# Patient Record
Sex: MALE | Race: WHITE | NOT HISPANIC OR LATINO
[De-identification: names, ages, dates, MRNs, and addresses within clinical notes are randomized per-mention and may not be internally consistent; named-entity substitution may affect disease eponyms.]

---

## 2019-09-24 PROBLEM — Z00.00 ENCOUNTER FOR PREVENTIVE HEALTH EXAMINATION: Status: ACTIVE | Noted: 2019-09-24

## 2019-09-25 ENCOUNTER — NON-APPOINTMENT (OUTPATIENT)
Age: 79
End: 2019-09-25

## 2019-09-25 ENCOUNTER — APPOINTMENT (OUTPATIENT)
Dept: OTOLARYNGOLOGY | Facility: CLINIC | Age: 79
End: 2019-09-25
Payer: MEDICARE

## 2019-09-25 ENCOUNTER — APPOINTMENT (OUTPATIENT)
Dept: OPHTHALMOLOGY | Facility: CLINIC | Age: 79
End: 2019-09-25
Payer: MEDICARE

## 2019-09-25 VITALS
BODY MASS INDEX: 28.2 KG/M2 | HEIGHT: 70 IN | DIASTOLIC BLOOD PRESSURE: 82 MMHG | WEIGHT: 197 LBS | SYSTOLIC BLOOD PRESSURE: 155 MMHG | HEART RATE: 51 BPM

## 2019-09-25 DIAGNOSIS — Z87.11 PERSONAL HISTORY OF PEPTIC ULCER DISEASE: ICD-10-CM

## 2019-09-25 DIAGNOSIS — Z87.891 PERSONAL HISTORY OF NICOTINE DEPENDENCE: ICD-10-CM

## 2019-09-25 DIAGNOSIS — Z82.3 FAMILY HISTORY OF STROKE: ICD-10-CM

## 2019-09-25 DIAGNOSIS — Z87.09 PERSONAL HISTORY OF OTHER DISEASES OF THE RESPIRATORY SYSTEM: ICD-10-CM

## 2019-09-25 DIAGNOSIS — Z83.3 FAMILY HISTORY OF DIABETES MELLITUS: ICD-10-CM

## 2019-09-25 DIAGNOSIS — Z82.49 FAMILY HISTORY OF ISCHEMIC HEART DISEASE AND OTHER DISEASES OF THE CIRCULATORY SYSTEM: ICD-10-CM

## 2019-09-25 DIAGNOSIS — Z86.79 PERSONAL HISTORY OF OTHER DISEASES OF THE CIRCULATORY SYSTEM: ICD-10-CM

## 2019-09-25 DIAGNOSIS — Z86.39 PERSONAL HISTORY OF OTHER ENDOCRINE, NUTRITIONAL AND METABOLIC DISEASE: ICD-10-CM

## 2019-09-25 PROCEDURE — 92567 TYMPANOMETRY: CPT

## 2019-09-25 PROCEDURE — 92557 COMPREHENSIVE HEARING TEST: CPT

## 2019-09-25 PROCEDURE — 69210 REMOVE IMPACTED EAR WAX UNI: CPT

## 2019-09-25 PROCEDURE — 99204 OFFICE O/P NEW MOD 45 MIN: CPT | Mod: 25

## 2019-09-25 PROCEDURE — 99204 OFFICE O/P NEW MOD 45 MIN: CPT

## 2019-09-25 PROCEDURE — 92134 CPTRZ OPH DX IMG PST SGM RTA: CPT

## 2019-09-25 PROCEDURE — 92083 EXTENDED VISUAL FIELD XM: CPT

## 2019-09-25 NOTE — CONSULT LETTER
[FreeTextEntry2] : ENRIQUE SON\par  [FreeTextEntry1] : \par \par Dear  Dr. OG SON,\par \par I had the pleasure of seeing your patient today.  \par Please see my note below.\par \par \par Thank you very much for allowing me to participate in the care of your patient.\par \par Sincerely,\par \par \par Og Hatch MD\par [facility]\par [facility]\par  [facility]\par \par

## 2019-09-25 NOTE — HISTORY OF PRESENT ILLNESS
[de-identified] : ABDI TELLO is a 79 year old male who comes in complaining of Several issues as far as the head and neck. The first is she is concerned about his hearing. He has problems in the future and in the classroom but denies otalgia or otorrhea.\par A second is that of dizziness. He notes that when he turns his head to the right he frequently but not always will get dizzy for a while. This has not occurred with otalgia or otorrhea and he has had this in the past which resolved.\par He also notes that when he bends his neck forward he can feel faint and anxious and have diminished sensation in his feet.\par He complains of itching in his ears and has been using a cortisone cream.\par The patient had no other ear nose or throat complaints at this visit.

## 2019-09-25 NOTE — REVIEW OF SYSTEMS
[Ear Itch] : ear itch [Hearing Loss] : hearing loss [Dizziness] : dizziness [Nasal Congestion] : nasal congestion [Lightheadedness] : lightheadedness [Recent Weight Loss (___ Lbs)] : recent [unfilled] ~Ulb weight loss [Eyesight Problems] : eyesight problems [Erectile Dysfunction] : erectile dysfunction [Dry Eyes] : dry eyes [Negative] : Heme/Lymph

## 2019-09-25 NOTE — PHYSICAL EXAM
[FreeTextEntry1] : \par The patient was alert and oriented and in no distress.\par Voice was clear.\par \par Face:\par The patient had no facial asymmetry or mass.\par The skin was unremarkable.\par \par Eyes:\par The pupils were equal round and reactive to light and accommodation.\par There was no significant nystagmus or disconjugate gaze noted.\par \par Nose: \par The external nose had no significant deformity.  There was no facial tenderness.  On anterior rhinoscopy, the nasal mucosa was clear.  The anterior septum was midline.  There were no visualized polyps purulence  or masses.\par \par Oral cavity:\par The oral mucosa was normal.\par The oral and base of tongue were clear and without mass.\par The gingival and buccal mucosa were moist and without lesions.\par The palate moved well.\par There was no cleft to the palate.\par There appeared to be good salivary flow.  \par There was no pus, erythema or mass in the oral cavity.\par \par Ears:\par Procedure Note\par Removal of Cerumen- right ear   cpt 88465\par Dx:  Symptomatic cerument impaction- right ear\par Both external ears were normal.\par There was a dense cerumen impaction in the right ear.  This was cleared microscopically using suction and curettes without trauma.  Beyond that, both ear canals were clear both eardrums were intact and mobile.\par He has dry flaking skin of the ear canals\par \par Neck: \par The neck was symmetrical.\par The parotid and submandibular glands were normal without masses.\par The trachea was midline and there was no unusual crepitus.\par The thyroid was smooth and nontender and no masses were palpated.\par There was no significant cervical adenopathy.\par \par \par Neuro:\par Neurologically, the patient was awake, alert, and oriented to person, place and time. There were no obvious focal neurologic abnormalities.  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

## 2019-09-25 NOTE — ASSESSMENT
[FreeTextEntry1] : It was my impression that he had some evidence of benign positional vertigo. I suggested that at the maneuver and he says he might just try this from the Internet.\par It is my impression that the patient has an an exczematoid otitis externa.  The pathogenesis was discussed.  I suggested avoiding Q-tips.  I recommended topical moisturizing and using either Dermotic drops or other oil drops.  I suggested repeat evaluation in 6 months or earlier should the need arise.\par His symptoms when he bends his neck for did not seem vestibular nor does his night sweats. I suggested a carotid Doppler duplex to make sure there is not an obstruction but I did not hear any significant bruit in the neck.\par He has the sensory neural hearing losses. For the theater I suggested using the infra-red systems\par The hearing test was reviewed with the patient.  This showed a symmetric primarily high frequency sensory neural hearing loss affecting the speech frequencies.  I felt that the patient would benefit from hearing aids and this was  recommended

## 2019-09-30 ENCOUNTER — APPOINTMENT (OUTPATIENT)
Dept: MRI IMAGING | Facility: CLINIC | Age: 79
End: 2019-09-30

## 2019-10-01 ENCOUNTER — OUTPATIENT (OUTPATIENT)
Dept: OUTPATIENT SERVICES | Facility: HOSPITAL | Age: 79
LOS: 1 days | End: 2019-10-01

## 2019-10-01 ENCOUNTER — APPOINTMENT (OUTPATIENT)
Dept: ULTRASOUND IMAGING | Facility: CLINIC | Age: 79
End: 2019-10-01
Payer: MEDICARE

## 2019-10-01 ENCOUNTER — APPOINTMENT (OUTPATIENT)
Dept: MRI IMAGING | Facility: CLINIC | Age: 79
End: 2019-10-01
Payer: MEDICARE

## 2019-10-01 PROCEDURE — 93880 EXTRACRANIAL BILAT STUDY: CPT | Mod: 26

## 2019-10-01 PROCEDURE — 70553 MRI BRAIN STEM W/O & W/DYE: CPT | Mod: 26

## 2019-10-10 ENCOUNTER — APPOINTMENT (OUTPATIENT)
Dept: OTOLARYNGOLOGY | Facility: CLINIC | Age: 79
End: 2019-10-10
Payer: MEDICARE

## 2019-10-10 DIAGNOSIS — R93.0 ABNORMAL FINDINGS ON DIAGNOSTIC IMAGING OF SKULL AND HEAD, NOT ELSEWHERE CLASSIFIED: ICD-10-CM

## 2019-10-10 PROCEDURE — 31231 NASAL ENDOSCOPY DX: CPT

## 2019-10-10 PROCEDURE — 99213 OFFICE O/P EST LOW 20 MIN: CPT | Mod: 25

## 2019-10-21 ENCOUNTER — FORM ENCOUNTER (OUTPATIENT)
Age: 79
End: 2019-10-21

## 2019-10-22 ENCOUNTER — APPOINTMENT (OUTPATIENT)
Dept: CT IMAGING | Facility: CLINIC | Age: 79
End: 2019-10-22
Payer: MEDICARE

## 2019-10-22 ENCOUNTER — OUTPATIENT (OUTPATIENT)
Dept: OUTPATIENT SERVICES | Facility: HOSPITAL | Age: 79
LOS: 1 days | End: 2019-10-22

## 2019-10-22 PROCEDURE — 70486 CT MAXILLOFACIAL W/O DYE: CPT | Mod: 26

## 2019-10-24 NOTE — PHYSICAL EXAM
[de-identified] : Nasal endoscopy: \par \par Procedure Note:\par \par Nasal endoscopy was done with topical anesthesia and a fiberoptic endoscope.\par Indication: Nasal congestion, rule out sinusitis.\par Procedure: The nasal cavity was anesthetized with topical Afrin and Pontocaine. An  endoscope was used and inserted into the nasal cavity. \par Endocoscopy was performed to inspect the interior of the nasal cavity, the nasal septum,  the middle and superior meati, the inferior, middle and superior turbinates, and the spheno-ethmoidal  recesses, the nasopharynx and eustachian tube orifices bilaterally\par  This showed that the nasal mucosa was slightly boggy.  There was a moderate S-shaped septal deflection.  However, the middle meati were open.  There were no polyps, purulence or masses.  The eustachian tube orifices were clear.  The nasopharynx was benign and without mass.  The inferior and middle turbinates were slightly boggy, the superior meati were normal and the sphenoethmoidal recesses were without evidence of disease.\par \par  [FreeTextEntry1] : General:\par The patient was alert and oriented and in no distress.\par Voice was clear.\par \par Face:\par The patient had no facial asymmetry or mass.\par The skin was unremarkable.\par \par Ears:\par The external ears were normal without deformity.\par The ear canals were clear.\par The tympanic membranes were intact and normal.\par The canals are somewhat dry but there was no recurrence of the cerumen impaction\par \par Oral cavity:\par The oral mucosa was normal.\par The oral and base of tongue were clear and without mass.\par The gingival and buccal mucosa were moist and without lesions.\par The palate moved well.\par There was no cleft to the palate.\par There appeared to be good salivary flow.  \par There was no pus, erythema or mass in the oral cavity.\par \par Neuro:\par Neurologically, the patient was awake, alert, and oriented to person, place and time. There were no obvious focal neurologic abnormalities.  Cranial nerves II through XII were grossly intact.\par \par Neck: \par The neck was symmetrical.\par The parotid and submandibular glands were normal without masses.\par The trachea was midline and there was no unusual crepitus.\par The thyroid was smooth and nontender and no masses were palpated.\par There was no significant cervical adenopathy.

## 2019-10-24 NOTE — ADDENDUM
[FreeTextEntry1] : Discussed with radiologist.  Feels tumor rather than cyst or other etiology.  ? Inverting papilloma or possibly melanoma-  \par However- no bony osteitis -  \par although I also see no lakesha invasion, etc.\par Discussed with patient-  will likely need caldwel edgardo approach- \par \par RLP

## 2019-10-24 NOTE — HISTORY OF PRESENT ILLNESS
[de-identified] : ABDI TELLO Was seen in followup on October 10. He reports that he has a papilloma in his sinus. He does have a watery nasal discharge. His dizziness is improving. I had seen him 2 weeks ago with sensory neural hearing losses, dizziness and eczematoid otitis externa. \par He notes that he does have a watery nasal discharge on the right\par The patient had no other ear nose or throat complaints at this visit.

## 2019-10-24 NOTE — ASSESSMENT
[FreeTextEntry1] : It was my impression that his dizziness is improving and I suggested continuing with vestibular exercises.\par His Doppler duplex apparently was negative.\par However, he also had an orbital MRI and they are reading his right maxillary sinus as having an inverting papilloma. I looked at the films myself and while there is a papillomatous looking lesion of the right lateral wall, that is fairly reportable if this were the inverting papilloma based on its location. It seems much more likely to be a retention cyst or perhaps inspissated mucus.\par In any case, and this is nothing I would aggressively addressed. 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

## 2019-10-24 NOTE — PHYSICAL EXAM
[FreeTextEntry1] : General:\par The patient was alert and oriented and in no distress.\par Voice was clear.\par \par Face:\par The patient had no facial asymmetry or mass.\par The skin was unremarkable.\par \par Ears:\par The external ears were normal without deformity.\par The ear canals were clear.\par The tympanic membranes were intact and normal.\par The canals are somewhat dry but there was no recurrence of the cerumen impaction\par \par Oral cavity:\par The oral mucosa was normal.\par The oral and base of tongue were clear and without mass.\par The gingival and buccal mucosa were moist and without lesions.\par The palate moved well.\par There was no cleft to the palate.\par There appeared to be good salivary flow.  \par There was no pus, erythema or mass in the oral cavity.\par \par Neuro:\par Neurologically, the patient was awake, alert, and oriented to person, place and time. There were no obvious focal neurologic abnormalities.  Cranial nerves II through XII were grossly intact.\par \par Neck: \par The neck was symmetrical.\par The parotid and submandibular glands were normal without masses.\par The trachea was midline and there was no unusual crepitus.\par The thyroid was smooth and nontender and no masses were palpated.\par There was no significant cervical adenopathy. [de-identified] : Nasal endoscopy: \par \par Procedure Note:\par \par Nasal endoscopy was done with topical anesthesia and a fiberoptic endoscope.\par Indication: Nasal congestion, rule out sinusitis.\par Procedure: The nasal cavity was anesthetized with topical Afrin and Pontocaine. An  endoscope was used and inserted into the nasal cavity. \par Endocoscopy was performed to inspect the interior of the nasal cavity, the nasal septum,  the middle and superior meati, the inferior, middle and superior turbinates, and the spheno-ethmoidal  recesses, the nasopharynx and eustachian tube orifices bilaterally\par  This showed that the nasal mucosa was slightly boggy.  There was a moderate S-shaped septal deflection.  However, the middle meati were open.  There were no polyps, purulence or masses.  The eustachian tube orifices were clear.  The nasopharynx was benign and without mass.  The inferior and middle turbinates were slightly boggy, the superior meati were normal and the sphenoethmoidal recesses were without evidence of disease.\par \par

## 2019-10-24 NOTE — HISTORY OF PRESENT ILLNESS
[de-identified] : ABDI TELLO Was seen in followup on October 10. He reports that he has a papilloma in his sinus. He does have a watery nasal discharge. His dizziness is improving. I had seen him 2 weeks ago with sensory neural hearing losses, dizziness and eczematoid otitis externa. \par He notes that he does have a watery nasal discharge on the right\par The patient had no other ear nose or throat complaints at this visit.

## 2019-12-11 ENCOUNTER — RX CHANGE (OUTPATIENT)
Age: 79
End: 2019-12-11

## 2019-12-12 ENCOUNTER — OUTPATIENT (OUTPATIENT)
Dept: OUTPATIENT SERVICES | Facility: HOSPITAL | Age: 79
LOS: 1 days | Discharge: HOME | End: 2019-12-12
Payer: MEDICARE

## 2019-12-12 ENCOUNTER — RESULT REVIEW (OUTPATIENT)
Age: 79
End: 2019-12-12

## 2019-12-12 ENCOUNTER — APPOINTMENT (OUTPATIENT)
Dept: OTOLARYNGOLOGY | Facility: AMBULATORY SURGERY CENTER | Age: 79
End: 2019-12-12

## 2019-12-12 PROCEDURE — 61782 SCAN PROC CRANIAL EXTRA: CPT

## 2019-12-12 PROCEDURE — 31225 REMOVAL OF UPPER JAW: CPT | Mod: 52

## 2019-12-16 ENCOUNTER — APPOINTMENT (OUTPATIENT)
Dept: OTOLARYNGOLOGY | Facility: CLINIC | Age: 79
End: 2019-12-16
Payer: MEDICARE

## 2019-12-16 PROCEDURE — 99024 POSTOP FOLLOW-UP VISIT: CPT

## 2019-12-16 PROCEDURE — 31237 NSL/SINS NDSC SURG BX POLYPC: CPT | Mod: 50,58

## 2019-12-16 RX ORDER — PREDNISONE 20 MG/1
20 TABLET ORAL DAILY
Qty: 4 | Refills: 0 | Status: DISCONTINUED | COMMUNITY
Start: 2019-12-11 | End: 2019-12-16

## 2019-12-18 LAB — SURGICAL PATHOLOGY STUDY: SIGNIFICANT CHANGE UP

## 2019-12-18 NOTE — REASON FOR VISIT
[de-identified] : 4 [de-identified] : He is now 4 days status post right sided Regan-Rafael as well as right-sided balloon sinuplasty.\par He is taking antibiotics and steroids as prescribed.\par He did develop some ecchymosis in his right face.\par He has minimal pain. Next line he does not complain of tooth, malar or gingival paresthesia.\par \par PROCEDURE:  NASAL ENDOSCOPY WITH SURGICAL DEBRIDEMENT\par \par Surgeon: Dr. Quintana\par Indication: status post surgery \par Anesthetic: Topical lidocaine and Afrin\par Procedure: The patient was placed in a sitting position.  Following application of the topical anesthetic and decongestant, exam was performed with a flexible fiberoptic scope followed by a rigid endoscope.  The scope was passed along the right nasal floor to the nasopharynx.  It was then passed into the region of the middle meatus, middle turbinate, and sphenoethmoid region.  An identical procedure was performed on the left side. Debridement was performed with the 0 deg endoscope and sinus instruments. The following findings were noted:\par \par Nasal mucosa:  Mild to moderate edema\par Clots and small crusts in nasal cavities causing obstruction.\par Septum: straight. No perforation. Healing well. \par Turbinates: inferior turbinates healing well. Crusting on anterior ends. \par Right nasal cavity- suctioned and debrided\par      Right middle meatus: edema \par      Right sphenoethmoidal recess: no pus, polyps or congestion \par \par Clinically stable.\par We will await his pathology.\par I recommended he continue with oral hygiene using salt water 2-3 times a day.\par He will also begin using hypertonic saline irrigation and Flonase on the right.

## 2019-12-18 NOTE — ASSESSMENT
[FreeTextEntry1] : path-  oncocytic schneiderian papilloma- i inversion-  discussed with pathologist-  and meand discuussed with patient.  Patient doing well- dental but not V2 numbness.  \par \par RLP

## 2020-01-07 ENCOUNTER — APPOINTMENT (OUTPATIENT)
Dept: OTOLARYNGOLOGY | Facility: CLINIC | Age: 80
End: 2020-01-07
Payer: MEDICARE

## 2020-01-07 PROCEDURE — 99024 POSTOP FOLLOW-UP VISIT: CPT

## 2020-01-07 NOTE — CONSULT LETTER
[FreeTextEntry2] : ENRIQUE SON\par  [FreeTextEntry1] : \par \par Dear  Dr. OG SON,\par \par I had the pleasure of seeing your patient today.  \par Please see my note below.\par \par \par Thank you very much for allowing me to participate in the care of your patient.\par \par Sincerely,\par \par \par Quentin Hatch\par \par \par Og Hatch MD\par [facility]\par [facility]\par  [facility]\par \par

## 2020-01-07 NOTE — PHYSICAL EXAM
[FreeTextEntry1] : General:\par The patient was alert and oriented and in no distress.\par Voice was clear. The patient looked well.\par He thought there might be some swelling over the right maxillary but there was none clinical E.\par \par His oral incision was healing well.\par \par His previous lower facial ecchymosis has resolved.\par \par  [de-identified] : Nasal endoscopy: \par CPT 77636\par Procedure Note:\par \par Nasal endoscopy was done with topical anesthesia of Pontocaine and Afrin and a      nasal endoscope.\par Indication: Nasal congestion, rule out sinusitis.\par Procedure: The nasal cavity was anesthetized with topical Afrin and Pontocaine. An  endoscope was used and inserted into the nasal cavity.\par Attention was first paid to the anterior nasal cavity.\par Endocoscopy was performed to inspect the interior of the nasal cavity, the nasal septum,  the middle and superior meati, the inferior, middle and superior turbinates, and the spheno-ethmoidal  recesses, the nasopharynx and eustachian tube orifices bilaterally. \par All findings were normal except:\par \par The right middle meatus had a scant amount of colored discharge and the antrostomy was pinpoint but patent. And without purulence\par \par \par His oral incision was well healed\par \par He had no V2 hypoesthesia\par

## 2020-01-07 NOTE — REASON FOR VISIT
[Post-Operative Visit] : a post-operative visit [FreeTextEntry2] : right oncocytic inverting papilloma s/p excision

## 2020-01-07 NOTE — ASSESSMENT
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 If the antrostomy closses I would consider re opening it in the office with balloon as much for repeat evaluation as for cleaning. \par I reviewed the pathology with the patient and this is quite unlikely to undergo malignant degeneration. -

## 2020-01-07 NOTE — HISTORY OF PRESENT ILLNESS
[de-identified] : ABDI TELLO Was seen in followup on January 7. 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 His surgery was uneventful and clinically the tumor was completely removed.  He notes some dental anesthesia did not facial hypoesthesiaand the thick nasal discharge on the right side.\par The patient had no other ear nose or throat complaints at this visit.

## 2020-01-29 ENCOUNTER — APPOINTMENT (OUTPATIENT)
Dept: OTOLARYNGOLOGY | Facility: CLINIC | Age: 80
End: 2020-01-29
Payer: MEDICARE

## 2020-01-29 VITALS
WEIGHT: 197 LBS | HEART RATE: 58 BPM | DIASTOLIC BLOOD PRESSURE: 81 MMHG | BODY MASS INDEX: 28.2 KG/M2 | HEIGHT: 70 IN | SYSTOLIC BLOOD PRESSURE: 147 MMHG

## 2020-01-29 PROCEDURE — 99213 OFFICE O/P EST LOW 20 MIN: CPT | Mod: 24

## 2020-01-29 RX ORDER — ACETAMINOPHEN AND CODEINE 300; 30 MG/1; MG/1
300-30 TABLET ORAL
Qty: 12 | Refills: 0 | Status: DISCONTINUED | COMMUNITY
Start: 2019-12-11 | End: 2020-01-29

## 2020-01-29 RX ORDER — AMOXICILLIN AND CLAVULANATE POTASSIUM 875; 125 MG/1; MG/1
875-125 TABLET, COATED ORAL TWICE DAILY
Qty: 20 | Refills: 1 | Status: DISCONTINUED | COMMUNITY
Start: 2020-01-07 | End: 2020-01-29

## 2020-01-29 NOTE — CONSULT LETTER
[FreeTextEntry2] : ENRIQUE SON\par  [FreeTextEntry1] : \par \par Dear  Dr. OG SON,\par \par I had the pleasure of seeing your patient today.  \par Please see my note below.\par \par \par Thank you very much for allowing me to participate in the care of your patient.\par \par Sincerely,\par \par Quentin\par \par Og Hatch MD\par [facility]\par [facility]\par  [facility]\par \par

## 2020-01-29 NOTE — PHYSICAL EXAM
[FreeTextEntry1] : General:\par The patient was alert and oriented and in no distress.\par Voice was clear.\par The patient looked less than the stated age\par \par Face:\par The patient had no facial asymmetry or mass.\par The skin was unremarkable.\par \par Nose: \par The external nose had no significant deformity.  There was no facial tenderness.  On anterior rhinoscopy, the nasal mucosa was clear.  The anterior septum was midline.  There were no visualized polyps purulence  or masses.\par \par Ears:\par The external ears were normal without deformity.\par The ear canals were clear.\par The tympanic membranes were intact and normal.\par \par Oral cavity:\par The incision was well healed\par \par Neck: \par The neck was symmetrical.\par The parotid and submandibular glands were normal without masses.\par The trachea was midline and there was no unusual crepitus.\par The thyroid was smooth and nontender and no masses were palpated.\par There was no significant cervical adenopathy. [de-identified] : Nasal endoscopy: \par CPT 37050\par Procedure Note:\par \par Nasal endoscopy was done with topical anesthesia of Pontocaine and Afrin and a      nasal endoscope.\par Indication: Nasal congestion, rule out sinusitis.\par Procedure: The nasal cavity was anesthetized with topical Afrin and Pontocaine. An  endoscope was used and inserted into the nasal cavity.\par Attention was first paid to the anterior nasal cavity.\par Endocoscopy was performed to inspect the interior of the nasal cavity, the nasal septum,  the middle and superior meati, the inferior, middle and superior turbinates, and the spheno-ethmoidal  recesses, the nasopharynx and eustachian tube orifices bilaterally. \par All findings were normal except:\par \par There was a dry eschar on the septum and on the right and there was a small amount of crusting in the right middle meatus.\par The antrostomy, however had almost closed in a could not enter the maxillary sinus.\par

## 2020-01-29 NOTE — HISTORY OF PRESENT ILLNESS
[de-identified] : ABDI TELLO Was seen in followup on January 29. He is about 6 weeks postoperatively. He is complaining of occasional dry a bloody discharge from the right nasal cavity soreness and burning. He denies any purulence or facial pain. He still has some dizziness at times when he rolls over the right side only. His dental hypesthesia is improved but not completely resolvedThe patient had no other ear nose or throat complaints at this visit.

## 2020-01-29 NOTE — ASSESSMENT
[FreeTextEntry1] : It was my impression that he was doing well. The hypesthesia had almost completely resolved. He is having some mucosal irritation, probably from dryness and the sprays and I suggested stopping Flonase, cutting back on the nasal rinse and using topical moisturizing for the nasal cavity.\par I would like to reevaluate in 3-4 weeks and would consider imaging to see if I should dilate his antrostomy for better visualization in the future\par \par he has an apparent benign positional vertigo, unrelated to his lesion and I suggested a repositioning maneuver\par

## 2020-03-04 ENCOUNTER — APPOINTMENT (OUTPATIENT)
Dept: OTOLARYNGOLOGY | Facility: CLINIC | Age: 80
End: 2020-03-04
Payer: MEDICARE

## 2020-03-04 PROCEDURE — 31231 NASAL ENDOSCOPY DX: CPT | Mod: 58

## 2020-03-04 PROCEDURE — 99213 OFFICE O/P EST LOW 20 MIN: CPT | Mod: 25

## 2020-03-04 NOTE — CONSULT LETTER
[FreeTextEntry2] : ENRIQUE SON\par  [FreeTextEntry1] : \par \par Dear  Dr. OG SON,\par \par I had the pleasure of seeing your patient today.  \par Please see my note below.\par \par \par Thank you very much for allowing me to participate in the care of your patient.\par \par Sincerely,\par \par Quentin\par \par Og Hatch MD\par [facility]\par [facility]\par  [facility]\par \par

## 2020-03-04 NOTE — ASSESSMENT
[FreeTextEntry1] : It was my impression that he has some benign positional vertigo and I recommended the Epley maneuver\par He has done quite well from the surgery and basically is asymptomatic. He has the schneiderian papilloma with inversion.\par I can not completely visualized the antrum as the antrostomy has closed.\par I would like to see him back in followup in 3 months from now and will probably do repeat baseline imaging rather than necessarily making a wider opening for visualization.  The option would be to do a balloon Sinuplasty to afford visualization, and depending on the imaging results

## 2020-03-04 NOTE — HISTORY OF PRESENT ILLNESS
[de-identified] : ABDI TELLO was seen in followup on March 4.  He is status post excision of the right oncocytic schneiderian papilloma with inversion of the right maxilla by extended Shereen edgardo in December. At this point he is doing quite well. He also little bit of area in his dentition with diminished sensation but otherwise he is asymptomatic. His nasal airway is good and he is not having recurrent infection. He comes in for repeat evaluation. He also has some positional spanning from time to time, basically when he turns over in bed and possibly BPPV. I had recommended an Epley maneuver but she had not yet gone for this as he does not find it so bothersome.The patient had no other ear nose or throat complaints at this visit.

## 2020-03-04 NOTE — PHYSICAL EXAM
[FreeTextEntry1] : \par The patient was alert and oriented and in no distress.\par Voice was clear.\par \par Face:\par The patient had no facial asymmetry or mass.\par The skin was unremarkable.\par \par Eyes:\par The pupils were equal round and reactive to light and accommodation.\par There was no significant nystagmus or disconjugate gaze noted.\par \par Nose: \par The external nose had no significant deformity.  There was no facial tenderness.  On anterior rhinoscopy, the nasal mucosa was clear.  The anterior septum was midline.  There were no visualized polyps purulence  or masses.\par \par Oral cavity:\par The oral mucosa was normal.\par The oral and base of tongue were clear and without mass.\par The gingival and buccal mucosa were moist and without lesions.\par The palate moved well.\par There was no cleft to the palate.\par There appeared to be good salivary flow.  \par There was no pus, erythema or mass in the oral cavity.\par \par \par Ears:\par The external ears were normal without deformity.\par The ear canals were clear.\par The tympanic membranes were intact and normal.\par \par Neck: \par The neck was symmetrical.\par The parotid and submandibular glands were normal without masses.\par The trachea was midline and there was no unusual crepitus.\par The thyroid was smooth and nontender and no masses were palpated.\par There was no significant cervical adenopathy.\par \par \par Neuro:\par Neurologically, the patient was awake, alert, and oriented to person, place and time. There were no obvious focal neurologic abnormalities.  Cranial nerves II through XII were grossly intact.\par \par \par TMJ:\par The temporomandibular joints were nontender.\par There was no abnormal crepitus and no significant malocclusion\par \par \par Nasal endoscopy: \par \par Procedure Note:\par \par Nasal endoscopy was done with topical anesthesia and a fiberoptic endoscope.\par Indication: Nasal congestion, rule out sinusitis.\par Procedure: The nasal cavity was anesthetized with topical Afrin and Pontocaine. An  endoscope was used and inserted into the nasal cavity. \par Endocoscopy was performed to inspect the interior of the nasal cavity, the nasal septum,  the middle and superior meati, the inferior, middle and superior turbinates, and the spheno-ethmoidal  recesses, the nasopharynx and eustachian tube orifices bilaterally\par  This showed that the nasal mucosa was slightly boggy.  There was a moderate S-shaped septal deflection.  However, the middle meati were open.  There were no polyps, purulence or masses.  The eustachian tube orifices were clear.  The nasopharynx was benign and without mass.  The inferior and middle turbinates were slightly boggy, the superior meati were normal and the sphenoethmoidal recesses were without evidence of disease.\par \par He has left septal deflection\par \par \par The antrostomy was narrowed so that I could not see well into his antrum.

## 2020-03-04 NOTE — REVIEW OF SYSTEMS
[Ear Itch] : ear itch [Nasal Congestion] : nasal congestion [Hearing Loss] : hearing loss [Eyesight Problems] : eyesight problems [Erectile Dysfunction] : erectile dysfunction [Negative] : Heme/Lymph

## 2020-06-03 ENCOUNTER — APPOINTMENT (OUTPATIENT)
Dept: OTOLARYNGOLOGY | Facility: CLINIC | Age: 80
End: 2020-06-03

## 2020-07-01 ENCOUNTER — APPOINTMENT (OUTPATIENT)
Dept: OTOLARYNGOLOGY | Facility: CLINIC | Age: 80
End: 2020-07-01
Payer: MEDICARE

## 2020-07-01 VITALS — BODY MASS INDEX: 28.2 KG/M2 | HEIGHT: 70 IN | WEIGHT: 197 LBS | TEMPERATURE: 96.5 F

## 2020-07-01 PROCEDURE — 31231 NASAL ENDOSCOPY DX: CPT

## 2020-07-01 PROCEDURE — 99214 OFFICE O/P EST MOD 30 MIN: CPT | Mod: 25

## 2020-07-01 NOTE — REVIEW OF SYSTEMS
[Hearing Loss] : hearing loss [Ear Itch] : ear itch [Erectile Dysfunction] : erectile dysfunction [Nasal Congestion] : nasal congestion [Eyesight Problems] : eyesight problems [Negative] : Heme/Lymph

## 2020-09-21 ENCOUNTER — APPOINTMENT (OUTPATIENT)
Dept: CT IMAGING | Facility: CLINIC | Age: 80
End: 2020-09-21
Payer: MEDICARE

## 2020-09-21 ENCOUNTER — OUTPATIENT (OUTPATIENT)
Dept: OUTPATIENT SERVICES | Facility: HOSPITAL | Age: 80
LOS: 1 days | End: 2020-09-21

## 2020-09-21 PROCEDURE — 70486 CT MAXILLOFACIAL W/O DYE: CPT | Mod: 26

## 2020-09-24 NOTE — ASSESSMENT
[FreeTextEntry1] : It was my impression that he was doing quite well. There was no evidence of recurrence of the papilloma, however, I could not enter the antrostomy for visualization. I discussed this with him and will plan CT imaging. He wanted to wait until he returns from [facility].  He will call before he returns and I will have the scan approved\par He has a dermatitis of the barbara bilaterally. This did not respond hydrocortisone ointment or cream and I suggested triamcinolone oil, but failing this,  I would recommend a dermatology evaluation.\par \par Lastly, his positional dizziness has responded without going for the Epley maneuver\par \par I will speak to him after the imaging\par \par More than 25 minutes was spent with the patient reviewing options, medical records and counseling about care, face to face.

## 2020-09-24 NOTE — CONSULT LETTER
[FreeTextEntry2] : ENRIQUE SON\par  [FreeTextEntry1] : \par \par Dear  Dr. OG SON,\par \par I had the pleasure of seeing your patient today.  \par Please see my note below.\par \par \par Thank you very much for allowing me to participate in the care of your patient.\par \par Hope you're well.\par \par Sincerely,\par \par Quentin\par \par Og Hatch MD\par [facility]\par [facility]\par  [facility]\par \par

## 2020-09-24 NOTE — HISTORY OF PRESENT ILLNESS
[de-identified] : ABDI TELLO was seen in followup on July 1.  I had last seen him on March 4. 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 At this point he is doing quite well. His  diminished sensation is almost gone but otherwise he is asymptomatic. His nasal airway is good and he is not having recurrent infection. He comes in for repeat evaluation. His positional spinning is improved without the Epley I had suggested.  He has an intermittent bilateral tragal itching that did not respond to HCT cream or ointment.  He has no other head and neck complaints.

## 2020-09-24 NOTE — ADDENDUM
[FreeTextEntry1] : CT  minimal nonspecific mucosal thickening at excision sight- otherwise clear.   Would just follow with repeat ct    RLP  (1 year)  RLP

## 2020-09-24 NOTE — PHYSICAL EXAM
[FreeTextEntry1] : \par The patient was alert and oriented and in no distress.\par Voice was clear.  \par \par Face:\par The patient had no facial asymmetry or mass.\par The skin was unremarkable.\par \par Eyes:\par The pupils were equal round and reactive to light and accommodation.\par There was no significant nystagmus or disconjugate gaze noted.\par \par Nose: \par The external nose had no significant deformity.  There was no facial tenderness.  On anterior rhinoscopy, the nasal mucosa was clear.  The anterior septum was midline.  There were no visualized polyps purulence  or masses.\par \par Oral cavity:\par The oral mucosa was normal.\par The oral and base of tongue were clear and without mass.\par The gingival and buccal mucosa were moist and without lesions.\par The palate moved well.\par There was no cleft to the palate.\par There appeared to be good salivary flow.  \par There was no pus, erythema or mass in the oral cavity.\par His incision was healed well\par \par Ears:\par The external ears were normal without deformity.\par The ear canals were clear.\par The tympanic membranes were intact and normal.\par He had a flaking dermatitis of the tragal skin in both ears towards the canals\par \par Neck: \par The neck was symmetrical.\par The parotid and submandibular glands were normal without masses.\par The trachea was midline and there was no unusual crepitus.\par The thyroid was smooth and nontender and no masses were palpated.\par There was no significant cervical adenopathy.\par \par \par Neuro:\par Neurologically, the patient was awake, alert, and oriented to person, place and time. There were no obvious focal neurologic abnormalities.  Cranial nerves II through XII were grossly intact.\par \par \par TMJ:\par The temporomandibular joints were nontender.\par There was no abnormal crepitus and no significant malocclusion\par  [de-identified] : Nasal endoscopy: \par \par Endoscopy was done with Covid precautions and with video. All risks and benefits were discussed with the patient and consent obtained.\par \par Procedure Note:\par \par Nasal endoscopy was done with topical anesthesia and a fiberoptic endoscope.\par Indication: Nasal congestion, rule out sinusitis.\par Procedure: The nasal cavity was anesthetized with topical Afrin and Pontocaine. An  endoscope was used and inserted into the nasal cavity. \par Endocoscopy was performed to inspect the interior of the nasal cavity, the nasal septum,  the middle and superior meati, the inferior, middle and superior turbinates, and the spheno-ethmoidal  recesses, the nasopharynx and eustachian tube orifices bilaterally\par  This showed that the nasal mucosa was slightly boggy.  There was a moderate S-shaped septal deflection.  However, the middle meati were open.  There were no polyps, purulence or masses.  The eustachian tube orifices were clear.  The nasopharynx was benign and without mass.  The inferior and middle turbinates were slightly boggy, the superior meati were normal and the sphenoethmoidal recesses were without evidence of disease.\par \par

## 2020-10-05 ENCOUNTER — TRANSCRIPTION ENCOUNTER (OUTPATIENT)
Age: 80
End: 2020-10-05

## 2020-10-16 ENCOUNTER — APPOINTMENT (OUTPATIENT)
Dept: OTOLARYNGOLOGY | Facility: CLINIC | Age: 80
End: 2020-10-16
Payer: MEDICARE

## 2020-10-16 VITALS — TEMPERATURE: 96.8 F | BODY MASS INDEX: 28.2 KG/M2 | WEIGHT: 197 LBS | HEIGHT: 70 IN

## 2020-10-16 DIAGNOSIS — H93.299 OTHER ABNORMAL AUDITORY PERCEPTIONS, UNSPECIFIED EAR: ICD-10-CM

## 2020-10-16 DIAGNOSIS — R42 DIZZINESS AND GIDDINESS: ICD-10-CM

## 2020-10-16 PROCEDURE — 99214 OFFICE O/P EST MOD 30 MIN: CPT | Mod: 25

## 2020-10-16 PROCEDURE — 92550 TYMPANOMETRY & REFLEX THRESH: CPT

## 2020-10-16 PROCEDURE — G0268 REMOVAL OF IMPACTED WAX MD: CPT

## 2020-10-16 PROCEDURE — 92557 COMPREHENSIVE HEARING TEST: CPT

## 2020-10-16 RX ORDER — CEFUROXIME AXETIL 250 MG/1
250 TABLET ORAL
Qty: 20 | Refills: 1 | Status: COMPLETED | COMMUNITY
Start: 2019-12-11 | End: 2020-10-16

## 2020-12-20 ENCOUNTER — TRANSCRIPTION ENCOUNTER (OUTPATIENT)
Age: 80
End: 2020-12-20

## 2021-01-17 ENCOUNTER — TRANSCRIPTION ENCOUNTER (OUTPATIENT)
Age: 81
End: 2021-01-17

## 2021-07-20 ENCOUNTER — RX RENEWAL (OUTPATIENT)
Age: 81
End: 2021-07-20

## 2021-07-20 RX ORDER — FLUOCINOLONE ACETONIDE 0.11 MG/ML
0.01 OIL TOPICAL
Qty: 118.28 | Refills: 3 | Status: ACTIVE | COMMUNITY
Start: 2020-07-01 | End: 1900-01-01

## 2021-09-15 ENCOUNTER — OUTPATIENT (OUTPATIENT)
Dept: OUTPATIENT SERVICES | Facility: HOSPITAL | Age: 81
LOS: 1 days | End: 2021-09-15

## 2021-09-15 ENCOUNTER — APPOINTMENT (OUTPATIENT)
Dept: CT IMAGING | Facility: CLINIC | Age: 81
End: 2021-09-15
Payer: MEDICARE

## 2021-09-15 ENCOUNTER — RESULT REVIEW (OUTPATIENT)
Age: 81
End: 2021-09-15

## 2021-09-15 PROCEDURE — 70486 CT MAXILLOFACIAL W/O DYE: CPT | Mod: 26

## 2021-09-15 NOTE — HISTORY OF PRESENT ILLNESS
[de-identified] : ABDI TELLO was seen in followup on October 16th.  I had last seen him on March 4. 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 his sinus CT from September 21 showed no evidence of recurrence.  \par It was recommended that a repeat scan in a year.  Otherwise she is asymptomatic\par He comes in now complaining of cerumen impactions. He has hearing losses and was going for hearing aids but needed to have the ears further evaluated and cleared.  \par He still notes some positional vertigo when he rolls over in bed, but this is improved\par He has the bilateral tragal dermatitis that responds to Dermotic oil but tends to recur.   The patient had no other ear nose or throat complaints at this visit.

## 2021-09-15 NOTE — CONSULT LETTER
[FreeTextEntry2] : ENRIQUE SON\par  [FreeTextEntry1] : \par \par Dear  Dr. OG SON,\par \par I had the pleasure of seeing your patient today.  \par Please see my note below.\par \par \par Thank you very much for allowing me to participate in the care of your patient.\par \par Sincerely,\par \par Quentin\par \par Og Hatch MD\par [facility]\par [facility]\par  [facility]\par \par

## 2021-09-15 NOTE — ASSESSMENT
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 Repeating the scan any year was recommended\par He still has some benign positional vertigo and the option of repositioning maneuver was discussed and he may decide to go ahead\par He has a recurrence eczema of the tragus bilaterally that did not respond hydrocortisone cream or ointment but does comment temporarily, responds to Dermotic oil and I suggested continuing to use as needed\par He may also want to followup with his dermatologist as well.\par It was my impression that the patient had a cerumen impaction that was cleared.  I recommended topical moisturizing.  I recommended avoiding Q-tips.  I reviewed aural hygiene and the role of cerumen with the patient.  I suggested a repeat visit in 6 months or earlier should the need arise.\par He has a sensory neural hearing losses and I gave him a copy of the hearing test to bring with him through his audiologist for hearing aids\par

## 2021-09-15 NOTE — PHYSICAL EXAM
[FreeTextEntry1] : \par The patient was alert and oriented and in no distress.\par Voice was clear.\par \par Face:\par The patient had no facial asymmetry or mass.\par The skin was unremarkable.\par \par Eyes:\par The pupils were equal round and reactive to light and accommodation.\par There was no significant nystagmus or disconjugate gaze noted.\par \par Nose: \par The external nose had no significant deformity.  There was no facial tenderness.  On anterior rhinoscopy, the nasal mucosa was clear.  The anterior septum was midline.  There were no visualized polyps purulence  or masses.\par \par Oral cavity:\par The oral mucosa was normal.\par The oral and base of tongue were clear and without mass.\par The gingival and buccal mucosa were moist and without lesions.\par The palate moved well.\par There was no cleft to the palate.\par There appeared to be good salivary flow.  \par There was no pus, erythema or mass in the oral cavity.\par \par \par Ears:\par  Procedure note:\par  Removal of Cerumen Impactions, both ears:  46119-03\par Both external ears were normal.\par There were symptomatic cerumen impactions in both ears.  These were cleared microscopically without trauma using both suction and curettes.  After clearing,  both ear canals were clear both eardrums were intact and mobile.  \par \par Neck: \par The neck was symmetrical.\par The parotid and submandibular glands were normal without masses.\par The trachea was midline and there was no unusual crepitus.\par The thyroid was smooth and nontender and no masses were palpated.\par There was no significant cervical adenopathy.\par \par \par Neuro:\par Neurologically, the patient was awake, alert, and oriented to person, place and time. There were no obvious focal neurologic abnormalities.  Cranial nerves II through XII were grossly intact.\par \par \par TMJ:\par The temporomandibular joints were nontender.\par There was no abnormal crepitus and no significant malocclusion\par \par

## 2021-09-15 NOTE — ADDENDUM
[FreeTextEntry1] : 9/15/21  CT report and films personally reviewed.  No signficant change-  maybe tiny recurrence - left sided mp changes..  RV as per previous-  will follow with imaging.  RL

## 2021-09-20 ENCOUNTER — NON-APPOINTMENT (OUTPATIENT)
Age: 81
End: 2021-09-20

## 2021-09-22 ENCOUNTER — TRANSCRIPTION ENCOUNTER (OUTPATIENT)
Age: 81
End: 2021-09-22

## 2021-09-22 ENCOUNTER — APPOINTMENT (OUTPATIENT)
Dept: OTOLARYNGOLOGY | Facility: CLINIC | Age: 81
End: 2021-09-22
Payer: MEDICARE

## 2021-09-22 VITALS — HEIGHT: 70.5 IN | BODY MASS INDEX: 28.31 KG/M2 | WEIGHT: 200 LBS | TEMPERATURE: 95 F

## 2021-09-22 DIAGNOSIS — R42 DIZZINESS AND GIDDINESS: ICD-10-CM

## 2021-09-22 PROCEDURE — 99214 OFFICE O/P EST MOD 30 MIN: CPT | Mod: 25

## 2021-09-22 PROCEDURE — 69210 REMOVE IMPACTED EAR WAX UNI: CPT

## 2021-09-22 PROCEDURE — 31231 NASAL ENDOSCOPY DX: CPT

## 2021-09-22 NOTE — CONSULT LETTER
[FreeTextEntry2] : ENRIQUE SON\par  [FreeTextEntry1] : \par \par Dear  Dr. OG SON,\par \par I had the pleasure of seeing your patient today.  \par Please see my note below.\par \par \par Thank you very much for allowing me to participate in the care of your patient.\par \par Sincerely,\par \par \par Og Hatch MD\par [facility]\par [facility]\par  [facility]\par \par

## 2021-09-22 NOTE — ASSESSMENT
[FreeTextEntry1] : General:\par The patient was alert and oriented and in no distress.\par Voice was clear.\par The patient looked less than the stated age\par \par Ears:\par  Procedure note:\par  Removal of Cerumen Impactions, both ears:  16567-29\par Both external ears were normal.\par There were symptomatic cerumen impactions in both ears.  These were cleared microscopically without trauma using both suction and curettes.  After clearing,  both ear canals were clear both eardrums were intact and mobile.  \par \par Neck: \par The neck was symmetrical.\par The parotid and submandibular glands were normal without masses.\par The trachea was midline and there was no unusual crepitus.\par The thyroid was smooth and nontender and no masses were palpated.\par There was no significant cervical adenopathy.\par \par Neuro:\par Neurologically, the patient was awake, alert, and oriented to person, place and time. There were no obvious focal neurologic abnormalities.  Cranial nerves II through XII were grossly intact.\par \par Otoneuro:\par No significant nystagmus was noted.\par Finger nose finger and rapid alternating motions were normal.\par Romberg testing was normal.\par Dexter-Hallpike maneuver was positive to the right.\par There was no bruit or thrill in the neck.\par \par Nasal endoscopy: \par \par Endoscopy was done with Covid precautions and with video. All risks and benefits were discussed with the patient and consent obtained.\par \par Procedure Note:\par \par Nasal endoscopy was done with topical anesthesia and a fiberoptic endoscope.\par Indication: Nasal congestion, rule out sinusitis.\par Procedure: The nasal cavity was anesthetized with topical Afrin and Pontocaine. An  endoscope was used and inserted into the nasal cavity. \par Endocoscopy was performed to inspect the interior of the nasal cavity, the nasal septum,  the middle and superior meati, the inferior, middle and superior turbinates, and the spheno-ethmoidal  recesses, the nasopharynx and eustachian tube orifices bilaterally\par  This showed that the nasal mucosa was slightly boggy.  There was a moderate S-shaped septal deflection.  However, the middle meati were open.  There were no polyps, purulence or masses.  The eustachian tube orifices were clear.  The nasopharynx was benign and without mass.  The inferior and middle turbinates were slightly boggy, the superior meati were normal and the sphenoethmoidal recesses were without evidence of disease.\par \par There was no evidence of recurrent  disease but the previous antrostomy had closed\par \par It was my impression that he probably has a benign paroxysmal positional vertigo. The pathogenesis and treatment were discussed. I suggested a trial of the Epley maneuver.\par It is my impression that the patient has an an exczematoid otitis externa.  The pathogenesis was discussed.  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 I reviewed his CT and CT report and this had no significant change from the previous year. I suggested repeating a CT scan in 2 years and will see him back in followup next year or earlier if needed\par \par \par \par \par

## 2021-09-22 NOTE — HISTORY OF PRESENT ILLNESS
[de-identified] : ABDI TELLO Was seen on September 22. I had last seen him a year ago. 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  He is basically asymptomatic and his repeat imaging was unchanged and shows a small area of increased mucosal change at the site of his previous tumor. This again had not changed at all in the ear. From this he is otherwise asymptomatic.\par He notes that he has pruritus of both ears which temporarily improved with Dermotic oral. This then tends to recur.  he also notes that at times when he turns the head to the right a while recumbent he gets dizzy for a brief period of time.  This is not constant however.  The patient had no other ear nose or throat complaints at this visit.\par The patient had no other ear nose or throat complaints at this visit.

## 2022-02-24 ENCOUNTER — APPOINTMENT (OUTPATIENT)
Dept: OPHTHALMOLOGY | Facility: CLINIC | Age: 82
End: 2022-02-24
Payer: MEDICARE

## 2022-02-24 ENCOUNTER — NON-APPOINTMENT (OUTPATIENT)
Age: 82
End: 2022-02-24

## 2022-02-24 PROCEDURE — 92133 CPTRZD OPH DX IMG PST SGM ON: CPT

## 2022-02-24 PROCEDURE — 92083 EXTENDED VISUAL FIELD XM: CPT

## 2022-02-24 PROCEDURE — 92014 COMPRE OPH EXAM EST PT 1/>: CPT

## 2022-05-16 ENCOUNTER — APPOINTMENT (OUTPATIENT)
Dept: OTOLARYNGOLOGY | Facility: CLINIC | Age: 82
End: 2022-05-16
Payer: MEDICARE

## 2022-05-16 DIAGNOSIS — H91.93 UNSPECIFIED HEARING LOSS, BILATERAL: ICD-10-CM

## 2022-05-16 PROCEDURE — G0268 REMOVAL OF IMPACTED WAX MD: CPT

## 2022-05-16 PROCEDURE — 92557 COMPREHENSIVE HEARING TEST: CPT

## 2022-05-16 PROCEDURE — 92567 TYMPANOMETRY: CPT

## 2022-05-16 PROCEDURE — 99215 OFFICE O/P EST HI 40 MIN: CPT | Mod: 25

## 2022-05-16 PROCEDURE — 31231 NASAL ENDOSCOPY DX: CPT

## 2022-05-16 RX ORDER — FAMOTIDINE 20 MG/1
20 TABLET, FILM COATED ORAL
Refills: 0 | Status: ACTIVE | COMMUNITY

## 2022-05-16 NOTE — ASSESSMENT
[FreeTextEntry1] : It was my impression that the patient had a cerumen impaction that was cleared.  I recommended topical moisturizing.  I recommended avoiding Q-tips.  I reviewed aural hygiene and the role of cerumen with the patient.  I suggested a repeat visit in a year or earlier should the need arise.\par Is eczematoid otitis externa was improved\par \par He has the difficulty breathing in and I do diagnosis of bronchiectasis.  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  He tells me his CT scan showed a hiatal hernia and that could affect his ability to breathe in although I doubt to the extent that he complains of it.  I suggested continuing on his famotidine and reflux diet and following up with the gastroenterology evaluation.\par \par He has some presbyphonic changes as well and some of the hoarseness can be loss of support and I suggested speech evaluation and therapy.\par \par Intranasally, there is no evidence of recurrence of his oncocytic schneiderian papilloma with inversion.  His scan was stable last fall and I had wanted to repeat this in a year and a half.  He has no evidence of disease at this point.\par \par \par

## 2022-05-16 NOTE — HISTORY OF PRESENT ILLNESS
[de-identified] : ABDI TELLO was seen on May 16.  I had last seen him in September.  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  He has been doing well from this and is relatively asymptomatic.  His last CT scan was unchanged and I had wanted to repeat the scan in another year and a half.\par He has the dermatitis of the ears which are better with the DermOtic.  He still gets cerumen impactions and comes in for that.\par He feels that his hearing is worse.  He does have sensory neural hearing losses and hearing aids.\par Additionally, he notes that he has been having difficulty taking a deep breath.  He had chest pain and acid reflux at night.  He is taking famotidine now for a month.  He tells me that he had pulmonary evaluation which showed bronchiectasis.  He has a dry cough.  He has had a pulmonary function test and I do not have the results but he tells me this was diminished.  He also is complaining of hoarseness.  He eats late in the evening but has been trying to improve this.  He had a negative cardiac evaluation including 4 days of hospitalization at [place] for this.  The patient had no other ear nose or throat complaints at this visit.

## 2022-05-16 NOTE — PHYSICAL EXAM
[de-identified] : General:\par The patient was alert and oriented and in no distress.\par Voice was clear.  His voice is somewhat weak though\par \par Ears:\par  Procedure note:\par  Removal of Cerumen Impactions, both ears:  26876-25\par Both external ears were normal.\par There were symptomatic cerumen impactions in both ears.  These were cleared microscopically without trauma using both suction and curettes.  After clearing,  both ear canals were clear both eardrums were intact and mobile.  \par \par Oral cavity:\par The oral mucosa was normal.\par The oral and base of tongue were clear and without mass.\par The gingival and buccal mucosa were moist and without lesions.\par The palate moved well.\par There was no cleft to the palate.\par There appeared to be good salivary flow.  \par There was no pus, erythema or mass in the oral cavity.\par \par Neck: \par The neck was symmetrical.\par The parotid and submandibular glands were normal without masses.\par The trachea was midline and there was no unusual crepitus.\par The thyroid was smooth and nontender and no masses were palpated.\par There was no significant cervical adenopathy.\par \par \par Neuro:\par Neurologically, the patient was awake, alert, and oriented to person, place and time. There were no obvious focal neurologic abnormalities.  Cranial nerves II through XII were grossly intact.\par \par \par Nasal endoscopy: \par \par Endoscopy was done with Covid precautions and with video. All risks and benefits were discussed with the patient and consent obtained.\par \par Procedure Note:\par \par Nasal endoscopy was done with topical anesthesia and a fiberoptic endoscope.\par Indication: Nasal congestion, rule out sinusitis.\par Procedure: The nasal cavity was anesthetized with topical Afrin and Pontocaine. An  endoscope was used and inserted into the nasal cavity. \par Endocoscopy was performed to inspect the interior of the nasal cavity, the nasal septum,  the middle and superior meati, the inferior, middle and superior turbinates, and the spheno-ethmoidal  recesses, the nasopharynx and eustachian tube orifices bilaterally\par  This showed that the nasal mucosa was slightly boggy.  There was a moderate S-shaped septal deflection.  However, the middle meati were open.  There were no polyps, purulence or masses.  The eustachian tube orifices were clear.  The nasopharynx was benign and without mass.  The inferior and middle turbinates were slightly boggy, the superior meati were normal and the sphenoethmoidal recesses were without evidence of disease.\par \par \par Flexible fiberoptic laryngoscopy: CPT 37190\par Indications: Dysphagia\par Procedure note:\par \par Flexible fiberoptic laryngoscopy was performed because of dysphagia and because of the patient's inability to tolerate adequate mirror examination.\par \par The nasal cavity was anesthetized with Pontocaine and Afrin.\par The flexible endoscope was placed into the patient's nasal cavity.\par The nasopharynx was without masses.\par The oropharynx, vallecula and base of tongue had no masses.\par The epiglottis, aryepiglottic folds and false vocal cords were normal.\par The pyriform sinuses were without mucosal lesions or pooling of secretions.  \par The laryngeal ventricles were without lesions.\par The visualized subglottis was without mass.\par The lateral and posterior pharyngeal walls were clear and symmetrical.\par The vocal folds were clear and mobile; they abducted and adducted normally.\par There was no interarytenoid mass or fullness.\par \par Endoscopy was done with Covid precautions and with video. All risks and benefits were discussed with the patient and consent obtained.\par \par He had loss of bulk of the right vocal fold with some pooling of secretions but no evidence of mass.\par \par

## 2022-05-16 NOTE — CONSULT LETTER
[DrSanthosh  ___] : Dr. ZHU [FreeTextEntry2] : ENRIQUE SON\par  [FreeTextEntry1] : \par \par Dear  Dr. OG SON,\par \par I had the pleasure of seeing your patient today.  \par Please see my note below.\par \par \par Thank you very much for allowing me to participate in the care of your patient.\par \par Sincerely,\par \par \par Og Hatch MD\par [facility]\par [facility]\par  [facility]\par \par

## 2022-05-17 PROBLEM — H91.93 DECREASED HEARING OF BOTH EARS: Status: ACTIVE | Noted: 2022-05-17

## 2022-05-23 ENCOUNTER — APPOINTMENT (OUTPATIENT)
Dept: OPHTHALMOLOGY | Facility: CLINIC | Age: 82
End: 2022-05-23
Payer: MEDICARE

## 2022-05-23 ENCOUNTER — NON-APPOINTMENT (OUTPATIENT)
Age: 82
End: 2022-05-23

## 2022-05-23 PROCEDURE — 92133 CPTRZD OPH DX IMG PST SGM ON: CPT

## 2022-05-23 PROCEDURE — 92083 EXTENDED VISUAL FIELD XM: CPT

## 2022-05-23 PROCEDURE — 92014 COMPRE OPH EXAM EST PT 1/>: CPT

## 2022-07-28 ENCOUNTER — APPOINTMENT (OUTPATIENT)
Dept: OTOLARYNGOLOGY | Facility: CLINIC | Age: 82
End: 2022-07-28

## 2022-09-26 ENCOUNTER — APPOINTMENT (OUTPATIENT)
Dept: OTOLARYNGOLOGY | Facility: CLINIC | Age: 82
End: 2022-09-26

## 2022-09-26 PROCEDURE — 31579 LARYNGOSCOPY TELESCOPIC: CPT

## 2022-09-26 PROCEDURE — 92524 BEHAVRAL QUALIT ANALYS VOICE: CPT | Mod: GN

## 2022-10-10 ENCOUNTER — NON-APPOINTMENT (OUTPATIENT)
Age: 82
End: 2022-10-10

## 2022-11-04 ENCOUNTER — APPOINTMENT (OUTPATIENT)
Dept: OTOLARYNGOLOGY | Facility: CLINIC | Age: 82
End: 2022-11-04

## 2022-11-04 PROCEDURE — 92507 TX SP LANG VOICE COMM INDIV: CPT | Mod: GN

## 2022-11-14 ENCOUNTER — APPOINTMENT (OUTPATIENT)
Dept: OPHTHALMOLOGY | Facility: CLINIC | Age: 82
End: 2022-11-14

## 2022-11-14 ENCOUNTER — NON-APPOINTMENT (OUTPATIENT)
Age: 82
End: 2022-11-14

## 2022-11-14 PROCEDURE — 92133 CPTRZD OPH DX IMG PST SGM ON: CPT

## 2022-11-14 PROCEDURE — 92083 EXTENDED VISUAL FIELD XM: CPT

## 2022-11-14 PROCEDURE — 92014 COMPRE OPH EXAM EST PT 1/>: CPT

## 2022-12-29 ENCOUNTER — APPOINTMENT (OUTPATIENT)
Dept: OTOLARYNGOLOGY | Facility: CLINIC | Age: 82
End: 2022-12-29
Payer: MEDICARE

## 2022-12-29 PROCEDURE — 92507 TX SP LANG VOICE COMM INDIV: CPT | Mod: GN

## 2023-01-27 ENCOUNTER — APPOINTMENT (OUTPATIENT)
Dept: OTOLARYNGOLOGY | Facility: CLINIC | Age: 83
End: 2023-01-27
Payer: MEDICARE

## 2023-01-27 PROCEDURE — 92507 TX SP LANG VOICE COMM INDIV: CPT | Mod: GN

## 2023-03-17 ENCOUNTER — APPOINTMENT (OUTPATIENT)
Dept: OTOLARYNGOLOGY | Facility: CLINIC | Age: 83
End: 2023-03-17

## 2023-05-15 RX ORDER — FLUOCINOLONE ACETONIDE 0.11 MG/ML
0.01 OIL TOPICAL
Qty: 118.28 | Refills: 3 | Status: ACTIVE | COMMUNITY
Start: 2023-05-15 | End: 1900-01-01

## 2023-08-23 ENCOUNTER — APPOINTMENT (OUTPATIENT)
Dept: OTOLARYNGOLOGY | Facility: CLINIC | Age: 83
End: 2023-08-23
Payer: MEDICARE

## 2023-08-23 VITALS — BODY MASS INDEX: 26.9 KG/M2 | HEIGHT: 70.5 IN | WEIGHT: 190 LBS

## 2023-08-23 DIAGNOSIS — R49.0 DYSPHONIA: ICD-10-CM

## 2023-08-23 PROCEDURE — G0268 REMOVAL OF IMPACTED WAX MD: CPT

## 2023-08-23 PROCEDURE — 31231 NASAL ENDOSCOPY DX: CPT

## 2023-08-23 PROCEDURE — 92557 COMPREHENSIVE HEARING TEST: CPT

## 2023-08-23 PROCEDURE — 99214 OFFICE O/P EST MOD 30 MIN: CPT | Mod: 25

## 2023-08-23 RX ORDER — HYDROCHLOROTHIAZIDE 12.5 MG/1
TABLET ORAL
Refills: 0 | Status: ACTIVE | COMMUNITY

## 2023-08-23 RX ORDER — LISINOPRIL 30 MG/1
TABLET ORAL
Refills: 0 | Status: ACTIVE | COMMUNITY

## 2023-08-23 RX ORDER — ATORVASTATIN CALCIUM 80 MG/1
TABLET, FILM COATED ORAL
Refills: 0 | Status: ACTIVE | COMMUNITY

## 2023-08-23 RX ORDER — ASPIRIN 81 MG
81 TABLET,CHEWABLE ORAL
Refills: 0 | Status: ACTIVE | COMMUNITY

## 2023-08-23 RX ORDER — UBIDECARENONE/VIT E ACET 100MG-5
CAPSULE ORAL
Refills: 0 | Status: ACTIVE | COMMUNITY

## 2023-08-23 NOTE — REVIEW OF SYSTEMS
[Hearing Loss] : hearing loss [Nasal Congestion] : nasal congestion [Sinus Pain] : sinus pain [Sinus Pressure] : sinus pressure [Throat Clearing] : throat clearing [Negative] : Heme/Lymph [de-identified] : nasal drainage [FreeTextEntry4] : headaches

## 2023-08-23 NOTE — ASSESSMENT
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  I suggested 1 further imaging of the paranasal sinuses to evaluate for recurrence.  He has a rhinitis and nasal congestion and has been under quite a bit of stress and has an exacerbation of his reflux.  I did not add medication at this point.  All of this was discussed with the patient.  His hearing losses are stable and I recommended having his hearing aids adjusted.  I recommend continuing with DermOtic for his ears and he had cerumen impactions that were cleared.  I suggested repeat evaluation in 6 months or as needed

## 2023-08-23 NOTE — CONSULT LETTER
[FreeTextEntry2] : ENRIQUE SON\par   [FreeTextEntry1] : \par  \par  Dear  Dr. OG SON,\par  \par  I had the pleasure of seeing your patient today.  \par  Please see my note below.\par  \par  \par  Thank you very much for allowing me to participate in the care of your patient.\par  \par  Sincerely,\par  \par  \par  Quentin Hatch\par  \par  \par  Og Hatch MD\par  [facility]\par  [facility]\par   [facility]\par  \par

## 2023-08-23 NOTE — REASON FOR VISIT
[Subsequent Evaluation] : a subsequent evaluation for [FreeTextEntry2] : sinus pressure and headaches

## 2023-08-23 NOTE — HISTORY OF PRESENT ILLNESS
[de-identified] : ABDI TELLO was seen on .  I had last seen him 15 months ago.  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  He has been asymptomatic.  His last CT scan was unchanged.  He has been under quite a bit of stress and his wife just  on Friday after falls and Alzheimer's.  He has the history of the dermatitis of the ears which is helped temporarily with DermOtic oil for a few days.  He has the sensorineural hearing losses and has been using his hearing aids at times.  He has the reflux at night and is using famotidine.  He has not really been following such a great diet recently and obviously has been under stress.  He had been in speech therapy last year and this had not improved his vocalization.  The patient had no other ear nose or throat complaints at this visit.

## 2023-08-23 NOTE — PHYSICAL EXAM
[FreeTextEntry1] : General: The patient was alert and oriented and in no distress. Voice was slightly mucousy.  Ears:  Procedure note:  Removal of Cerumen Impactions, both ears:  11491-05 Both external ears were normal. There were symptomatic cerumen impactions in both ears.  These were cleared microscopically without trauma using both suction and curettes.  After clearing,  both ear canals were clear both eardrums were intact and mobile.    Nasal endoscopy:   Endoscopy was done with Covid precautions and with video. All risks and benefits were discussed with the patient and consent obtained.  Procedure Note:  Nasal endoscopy was done with topical anesthesia and a fiberoptic endoscope. Indication: Nasal congestion, rule out sinusitis. Procedure: The nasal cavity was anesthetized with topical Afrin and Pontocaine. An  endoscope was used and inserted into the nasal cavity.  Endocoscopy was performed to inspect the interior of the nasal cavity, the nasal septum,  the middle and superior meati, the inferior, middle and superior turbinates, and the spheno-ethmoidal  recesses, the nasopharynx and eustachian tube orifices bilaterally including the nasal mocosa, the possibility of polyps and the consistency of the nasal mucous.  This showed that the nasal mucosa was slightly boggy.  There was a moderate S-shaped septal deflection.  However, the middle meati were open.  There were no polyps, purulence or masses.  The eustachian tube orifices were clear.  The nasopharynx was benign and without mass.  The inferior and middle turbinates were slightly boggy, the superior meati were normal and the sphenoethmoidal recesses were without evidence of disease. The mucosa is slightly boggy  Face: The patient had no facial asymmetry or mass. The skin was unremarkable.  Flexible fiberoptic laryngoscopy: CPT 28394 Indications: Dysphagia Procedure note:  Flexible fiberoptic laryngoscopy was performed because of dysphagia and because of the patient's inability to tolerate adequate mirror examination.  The nasal cavity was anesthetized with Pontocaine and Afrin. The flexible endoscope was placed into the patient's nasal cavity. The nasopharynx was without masses. The oropharynx, vallecula and base of tongue had no masses. The epiglottis, aryepiglottic folds and false vocal cords were normal. The pyriform sinuses were without mucosal lesions or pooling of secretions.   The laryngeal ventricles were without lesions. The visualized subglottis was without mass. The lateral and posterior pharyngeal walls were clear and symmetrical. The vocal folds were clear and mobile; they abducted and adducted normally. There was no interarytenoid mass or fullness.  Endoscopy was done with Covid precautions and with video. All risks and benefits were discussed with the patient and consent obtained.  He had pooling of secretions in the piriforms without evidence of mass  [de-identified] : A complete audiogram was ordered, done and reviewed with the patient.  This showed the sensorineural hearing losses, minimally changed from a year and a half ago

## 2023-09-19 ENCOUNTER — APPOINTMENT (OUTPATIENT)
Dept: CT IMAGING | Facility: CLINIC | Age: 83
End: 2023-09-19
Payer: MEDICARE

## 2023-09-19 ENCOUNTER — OUTPATIENT (OUTPATIENT)
Dept: OUTPATIENT SERVICES | Facility: HOSPITAL | Age: 83
LOS: 1 days | End: 2023-09-19

## 2023-09-19 PROCEDURE — 70486 CT MAXILLOFACIAL W/O DYE: CPT | Mod: 26

## 2023-11-13 ENCOUNTER — NON-APPOINTMENT (OUTPATIENT)
Age: 83
End: 2023-11-13

## 2023-11-13 ENCOUNTER — APPOINTMENT (OUTPATIENT)
Dept: OPHTHALMOLOGY | Facility: CLINIC | Age: 83
End: 2023-11-13
Payer: MEDICARE

## 2023-11-13 PROCEDURE — 92083 EXTENDED VISUAL FIELD XM: CPT

## 2023-11-13 PROCEDURE — 92133 CPTRZD OPH DX IMG PST SGM ON: CPT

## 2023-11-13 PROCEDURE — 92014 COMPRE OPH EXAM EST PT 1/>: CPT

## 2024-01-15 ENCOUNTER — OUTPATIENT (OUTPATIENT)
Dept: OUTPATIENT SERVICES | Facility: HOSPITAL | Age: 84
LOS: 1 days | End: 2024-01-15

## 2024-01-15 ENCOUNTER — APPOINTMENT (OUTPATIENT)
Dept: MRI IMAGING | Facility: CLINIC | Age: 84
End: 2024-01-15
Payer: MEDICARE

## 2024-01-15 PROCEDURE — 73721 MRI JNT OF LWR EXTRE W/O DYE: CPT | Mod: 26,RT

## 2024-02-21 ENCOUNTER — APPOINTMENT (OUTPATIENT)
Dept: OTOLARYNGOLOGY | Facility: CLINIC | Age: 84
End: 2024-02-21
Payer: MEDICARE

## 2024-02-21 DIAGNOSIS — Z98.890 OTHER SPECIFIED POSTPROCEDURAL STATES: ICD-10-CM

## 2024-02-21 DIAGNOSIS — H90.5 UNSPECIFIED SENSORINEURAL HEARING LOSS: ICD-10-CM

## 2024-02-21 PROCEDURE — 31231 NASAL ENDOSCOPY DX: CPT

## 2024-02-21 PROCEDURE — 99214 OFFICE O/P EST MOD 30 MIN: CPT | Mod: 25

## 2024-02-21 PROCEDURE — 69210 REMOVE IMPACTED EAR WAX UNI: CPT

## 2024-02-21 RX ORDER — IPRATROPIUM BROMIDE 21 UG/1
0.03 SPRAY NASAL 3 TIMES DAILY
Qty: 1 | Refills: 5 | Status: ACTIVE | COMMUNITY
Start: 2024-02-21 | End: 1900-01-01

## 2024-05-18 ENCOUNTER — NON-APPOINTMENT (OUTPATIENT)
Age: 84
End: 2024-05-18

## 2024-05-23 ENCOUNTER — APPOINTMENT (OUTPATIENT)
Dept: OTOLARYNGOLOGY | Facility: CLINIC | Age: 84
End: 2024-05-23
Payer: MEDICARE

## 2024-05-23 DIAGNOSIS — J31.0 CHRONIC RHINITIS: ICD-10-CM

## 2024-05-23 DIAGNOSIS — D14.0 BENIGN NEOPLASM OF MIDDLE EAR, NASAL CAVITY AND ACCESSORY SINUSES: ICD-10-CM

## 2024-05-23 DIAGNOSIS — H60.549 ACUTE ECZEMATOID OTITIS EXTERNA, UNSPECIFIED EAR: ICD-10-CM

## 2024-05-23 DIAGNOSIS — K21.9 GASTRO-ESOPHAGEAL REFLUX DISEASE W/OUT ESOPHAGITIS: ICD-10-CM

## 2024-05-23 DIAGNOSIS — J30.0 VASOMOTOR RHINITIS: ICD-10-CM

## 2024-05-23 DIAGNOSIS — H61.20 IMPACTED CERUMEN, UNSPECIFIED EAR: ICD-10-CM

## 2024-05-23 DIAGNOSIS — H90.3 SENSORINEURAL HEARING LOSS, BILATERAL: ICD-10-CM

## 2024-05-23 PROCEDURE — 31231 NASAL ENDOSCOPY DX: CPT

## 2024-05-23 PROCEDURE — G0268 REMOVAL OF IMPACTED WAX MD: CPT

## 2024-05-23 PROCEDURE — 92567 TYMPANOMETRY: CPT

## 2024-05-23 PROCEDURE — 92557 COMPREHENSIVE HEARING TEST: CPT

## 2024-05-23 PROCEDURE — 99214 OFFICE O/P EST MOD 30 MIN: CPT | Mod: 25

## 2024-05-23 RX ORDER — OMEPRAZOLE 40 MG/1
40 CAPSULE, DELAYED RELEASE ORAL
Qty: 14 | Refills: 3 | Status: ACTIVE | COMMUNITY
Start: 2024-05-23 | End: 1900-01-01

## 2024-05-23 RX ORDER — FLUTICASONE PROPIONATE 50 UG/1
50 SPRAY, METERED NASAL
Qty: 1 | Refills: 4 | Status: ACTIVE | COMMUNITY
Start: 2024-05-23 | End: 1900-01-01

## 2024-05-23 NOTE — ASSESSMENT
[FreeTextEntry1] : It was my impression needed cerumen impaction of the right ear that was cleared and beyond that a dermatitis.  He has been using the DermOtic drops and I recommended stopping temporarily and I placed CSF powder in case there is a fungal component in the ear canal.  I felt he could go back to the DermOtic in 2 weeks He has a significant nasopharyngitis.  Some of this is irritative and some this could be from reflux.  I recommended adding a 2-week course of omeprazole before breakfast and cutting back on his coffee He is taking famotidine at bedtime I suggested twice daily fluticasone and holding off on the ipratropium for 2 weeks and would like to see him back if this has not responded His audiometry showed a symmetric high-frequency sensorineural hearing loss is starting in the speech frequencies but no evidence of middle ear disease or eustachian tube dysfunction

## 2024-05-23 NOTE — HISTORY OF PRESENT ILLNESS
[de-identified] : ABDI TELLO is a 84 year old male who comes in complaining of having had an upper respiratory tract infection about 3 to 4 weeks ago.  Developed pruritus in his ears more on the right than the left that got somewhat better with DermOtic drops.  He is now feeling that he has nasal congestion and pressure across the face.  He thinks his eustachian tube might be blocked.  He has been using ipratropium.  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

## 2024-05-23 NOTE — CONSULT LETTER
[FreeTextEntry2] : ENRIQUE SON [FreeTextEntry1] :   Dear  Dr. OG SON,  I had the pleasure of seeing your patient today.   Please see my note below.   Thank you very much for allowing me to participate in the care of your patient.  Sincerely,  Og Hatch MD [facility]  [facility]

## 2024-05-30 PROBLEM — H90.3 SENSORINEURAL HEARING LOSS (SNHL) OF BOTH EARS: Status: ACTIVE | Noted: 2019-09-25

## 2024-07-11 ENCOUNTER — NON-APPOINTMENT (OUTPATIENT)
Age: 84
End: 2024-07-11

## 2024-10-09 ENCOUNTER — APPOINTMENT (OUTPATIENT)
Dept: OTOLARYNGOLOGY | Facility: CLINIC | Age: 84
End: 2024-10-09
Payer: MEDICARE

## 2024-10-09 DIAGNOSIS — J31.0 CHRONIC RHINITIS: ICD-10-CM

## 2024-10-09 DIAGNOSIS — R42 DIZZINESS AND GIDDINESS: ICD-10-CM

## 2024-10-09 DIAGNOSIS — H90.3 SENSORINEURAL HEARING LOSS, BILATERAL: ICD-10-CM

## 2024-10-09 PROCEDURE — 31231 NASAL ENDOSCOPY DX: CPT

## 2024-10-09 PROCEDURE — 99214 OFFICE O/P EST MOD 30 MIN: CPT | Mod: 25

## 2025-02-19 ENCOUNTER — APPOINTMENT (OUTPATIENT)
Dept: OTOLARYNGOLOGY | Facility: CLINIC | Age: 85
End: 2025-02-19
Payer: MEDICARE

## 2025-02-19 DIAGNOSIS — R42 DIZZINESS AND GIDDINESS: ICD-10-CM

## 2025-02-19 DIAGNOSIS — J31.0 CHRONIC RHINITIS: ICD-10-CM

## 2025-02-19 DIAGNOSIS — H90.5 UNSPECIFIED SENSORINEURAL HEARING LOSS: ICD-10-CM

## 2025-02-19 PROCEDURE — 31231 NASAL ENDOSCOPY DX: CPT

## 2025-02-19 PROCEDURE — 99214 OFFICE O/P EST MOD 30 MIN: CPT | Mod: 25

## 2025-02-19 RX ORDER — VALSARTAN 40 MG/1
40 TABLET, COATED ORAL
Refills: 0 | Status: ACTIVE | COMMUNITY

## 2025-03-21 ENCOUNTER — APPOINTMENT (OUTPATIENT)
Dept: CT IMAGING | Facility: CLINIC | Age: 85
End: 2025-03-21
Payer: MEDICARE

## 2025-03-21 ENCOUNTER — OUTPATIENT (OUTPATIENT)
Dept: OUTPATIENT SERVICES | Facility: HOSPITAL | Age: 85
LOS: 1 days | End: 2025-03-21

## 2025-03-21 PROCEDURE — 70486 CT MAXILLOFACIAL W/O DYE: CPT | Mod: 26

## 2025-04-09 ENCOUNTER — APPOINTMENT (OUTPATIENT)
Dept: OTOLARYNGOLOGY | Facility: CLINIC | Age: 85
End: 2025-04-09
Payer: MEDICARE

## 2025-04-09 DIAGNOSIS — J30.0 VASOMOTOR RHINITIS: ICD-10-CM

## 2025-04-09 DIAGNOSIS — D14.0 BENIGN NEOPLASM OF MIDDLE EAR, NASAL CAVITY AND ACCESSORY SINUSES: ICD-10-CM

## 2025-04-09 DIAGNOSIS — J31.0 CHRONIC RHINITIS: ICD-10-CM

## 2025-04-09 DIAGNOSIS — H91.93 UNSPECIFIED HEARING LOSS, BILATERAL: ICD-10-CM

## 2025-04-09 DIAGNOSIS — H90.5 UNSPECIFIED SENSORINEURAL HEARING LOSS: ICD-10-CM

## 2025-04-09 PROCEDURE — 99214 OFFICE O/P EST MOD 30 MIN: CPT | Mod: 25

## 2025-04-09 PROCEDURE — 31231 NASAL ENDOSCOPY DX: CPT
